# Patient Record
Sex: FEMALE | Race: BLACK OR AFRICAN AMERICAN | NOT HISPANIC OR LATINO | URBAN - METROPOLITAN AREA
[De-identification: names, ages, dates, MRNs, and addresses within clinical notes are randomized per-mention and may not be internally consistent; named-entity substitution may affect disease eponyms.]

---

## 2017-09-13 ENCOUNTER — EMERGENCY (EMERGENCY)
Facility: HOSPITAL | Age: 27
LOS: 1 days | Discharge: HOME | End: 2017-09-13
Admitting: EMERGENCY MEDICINE
Payer: SELF-PAY

## 2017-09-13 VITALS
HEART RATE: 75 BPM | SYSTOLIC BLOOD PRESSURE: 116 MMHG | OXYGEN SATURATION: 100 % | TEMPERATURE: 98 F | RESPIRATION RATE: 17 BRPM | DIASTOLIC BLOOD PRESSURE: 67 MMHG

## 2017-09-13 VITALS
DIASTOLIC BLOOD PRESSURE: 68 MMHG | SYSTOLIC BLOOD PRESSURE: 111 MMHG | OXYGEN SATURATION: 100 % | RESPIRATION RATE: 16 BRPM | TEMPERATURE: 99 F | HEART RATE: 85 BPM

## 2017-09-13 LAB
ALBUMIN SERPL ELPH-MCNC: 4.2 G/DL — SIGNIFICANT CHANGE UP (ref 3.3–5)
ALP SERPL-CCNC: 65 U/L — SIGNIFICANT CHANGE UP (ref 40–120)
ALT FLD-CCNC: 17 U/L — SIGNIFICANT CHANGE UP (ref 4–33)
APPEARANCE UR: CLEAR — SIGNIFICANT CHANGE UP
AST SERPL-CCNC: 19 U/L — SIGNIFICANT CHANGE UP (ref 4–32)
BACTERIA # UR AUTO: SIGNIFICANT CHANGE UP
BASOPHILS # BLD AUTO: 0.03 K/UL — SIGNIFICANT CHANGE UP (ref 0–0.2)
BASOPHILS NFR BLD AUTO: 0.3 % — SIGNIFICANT CHANGE UP (ref 0–2)
BILIRUB SERPL-MCNC: 0.7 MG/DL — SIGNIFICANT CHANGE UP (ref 0.2–1.2)
BILIRUB UR-MCNC: NEGATIVE — SIGNIFICANT CHANGE UP
BLOOD UR QL VISUAL: NEGATIVE — SIGNIFICANT CHANGE UP
BUN SERPL-MCNC: 10 MG/DL — SIGNIFICANT CHANGE UP (ref 7–23)
CALCIUM SERPL-MCNC: 9.4 MG/DL — SIGNIFICANT CHANGE UP (ref 8.4–10.5)
CHLORIDE SERPL-SCNC: 101 MMOL/L — SIGNIFICANT CHANGE UP (ref 98–107)
CO2 SERPL-SCNC: 21 MMOL/L — LOW (ref 22–31)
COLOR SPEC: YELLOW — SIGNIFICANT CHANGE UP
CREAT SERPL-MCNC: 0.74 MG/DL — SIGNIFICANT CHANGE UP (ref 0.5–1.3)
EOSINOPHIL # BLD AUTO: 0.39 K/UL — SIGNIFICANT CHANGE UP (ref 0–0.5)
EOSINOPHIL NFR BLD AUTO: 4.5 % — SIGNIFICANT CHANGE UP (ref 0–6)
GLUCOSE SERPL-MCNC: 88 MG/DL — SIGNIFICANT CHANGE UP (ref 70–99)
GLUCOSE UR-MCNC: NEGATIVE — SIGNIFICANT CHANGE UP
HCT VFR BLD CALC: 37.9 % — SIGNIFICANT CHANGE UP (ref 34.5–45)
HGB BLD-MCNC: 12.7 G/DL — SIGNIFICANT CHANGE UP (ref 11.5–15.5)
IMM GRANULOCYTES # BLD AUTO: 0.02 # — SIGNIFICANT CHANGE UP
IMM GRANULOCYTES NFR BLD AUTO: 0.2 % — SIGNIFICANT CHANGE UP (ref 0–1.5)
KETONES UR-MCNC: NEGATIVE — SIGNIFICANT CHANGE UP
LEUKOCYTE ESTERASE UR-ACNC: SIGNIFICANT CHANGE UP
LIDOCAIN IGE QN: 37.5 U/L — SIGNIFICANT CHANGE UP (ref 7–60)
LYMPHOCYTES # BLD AUTO: 2.36 K/UL — SIGNIFICANT CHANGE UP (ref 1–3.3)
LYMPHOCYTES # BLD AUTO: 27.5 % — SIGNIFICANT CHANGE UP (ref 13–44)
MCHC RBC-ENTMCNC: 29.8 PG — SIGNIFICANT CHANGE UP (ref 27–34)
MCHC RBC-ENTMCNC: 33.5 % — SIGNIFICANT CHANGE UP (ref 32–36)
MCV RBC AUTO: 89 FL — SIGNIFICANT CHANGE UP (ref 80–100)
MONOCYTES # BLD AUTO: 0.68 K/UL — SIGNIFICANT CHANGE UP (ref 0–0.9)
MONOCYTES NFR BLD AUTO: 7.9 % — SIGNIFICANT CHANGE UP (ref 2–14)
MUCOUS THREADS # UR AUTO: SIGNIFICANT CHANGE UP
NEUTROPHILS # BLD AUTO: 5.11 K/UL — SIGNIFICANT CHANGE UP (ref 1.8–7.4)
NEUTROPHILS NFR BLD AUTO: 59.6 % — SIGNIFICANT CHANGE UP (ref 43–77)
NITRITE UR-MCNC: NEGATIVE — SIGNIFICANT CHANGE UP
NRBC # FLD: 0 — SIGNIFICANT CHANGE UP
PH UR: 6 — SIGNIFICANT CHANGE UP (ref 4.6–8)
PLATELET # BLD AUTO: 195 K/UL — SIGNIFICANT CHANGE UP (ref 150–400)
PMV BLD: 10.8 FL — SIGNIFICANT CHANGE UP (ref 7–13)
POTASSIUM SERPL-MCNC: 3.6 MMOL/L — SIGNIFICANT CHANGE UP (ref 3.5–5.3)
POTASSIUM SERPL-SCNC: 3.6 MMOL/L — SIGNIFICANT CHANGE UP (ref 3.5–5.3)
PROT SERPL-MCNC: 7.7 G/DL — SIGNIFICANT CHANGE UP (ref 6–8.3)
PROT UR-MCNC: 10 — SIGNIFICANT CHANGE UP
RBC # BLD: 4.26 M/UL — SIGNIFICANT CHANGE UP (ref 3.8–5.2)
RBC # FLD: 13.6 % — SIGNIFICANT CHANGE UP (ref 10.3–14.5)
RBC CASTS # UR COMP ASSIST: SIGNIFICANT CHANGE UP (ref 0–?)
SODIUM SERPL-SCNC: 134 MMOL/L — LOW (ref 135–145)
SP GR SPEC: 1.02 — SIGNIFICANT CHANGE UP (ref 1–1.03)
SQUAMOUS # UR AUTO: SIGNIFICANT CHANGE UP
UROBILINOGEN FLD QL: 1 E.U. — SIGNIFICANT CHANGE UP (ref 0.1–0.2)
WBC # BLD: 8.59 K/UL — SIGNIFICANT CHANGE UP (ref 3.8–10.5)
WBC # FLD AUTO: 8.59 K/UL — SIGNIFICANT CHANGE UP (ref 3.8–10.5)

## 2017-09-13 PROCEDURE — 99284 EMERGENCY DEPT VISIT MOD MDM: CPT

## 2017-09-13 PROCEDURE — 76705 ECHO EXAM OF ABDOMEN: CPT | Mod: 26

## 2017-09-13 RX ORDER — IBUPROFEN 200 MG
600 TABLET ORAL ONCE
Qty: 0 | Refills: 0 | Status: COMPLETED | OUTPATIENT
Start: 2017-09-13 | End: 2017-09-13

## 2017-09-13 RX ORDER — SODIUM CHLORIDE 9 MG/ML
1000 INJECTION INTRAMUSCULAR; INTRAVENOUS; SUBCUTANEOUS ONCE
Qty: 0 | Refills: 0 | Status: COMPLETED | OUTPATIENT
Start: 2017-09-13 | End: 2017-09-13

## 2017-09-13 RX ADMIN — Medication 600 MILLIGRAM(S): at 23:19

## 2017-09-13 RX ADMIN — SODIUM CHLORIDE 2000 MILLILITER(S): 9 INJECTION INTRAMUSCULAR; INTRAVENOUS; SUBCUTANEOUS at 21:29

## 2017-09-13 RX ADMIN — Medication 600 MILLIGRAM(S): at 23:54

## 2017-09-13 NOTE — ED ADULT NURSE REASSESSMENT NOTE - NS ED NURSE REASSESS COMMENT FT1
rec'd pt. asleep but arousable, NS infusing via g20 saline lock on left ac with no ss of infiltration. c/o pain mid abdomen, med given as ordered. awaits dispo. will continue to monitor

## 2017-09-13 NOTE — ED PROVIDER NOTE - ABDOMINAL EXAM
Tenderness to RUQ and epigastric area. No hepatosplenomegaly. No rebound. No guarding. Easy to distract Mild TTP LUQ. No hepatosplenomegaly. No rebound. No guarding. Easy to distract

## 2017-09-13 NOTE — ED ADULT NURSE NOTE - CHPI ED SYMPTOMS NEG
no fever/no abdominal distension/no blood in stool/no hematuria/no dysuria/no vomiting/no chills/no nausea/no burning urination/no diarrhea

## 2017-09-13 NOTE — ED ADULT NURSE NOTE - OBJECTIVE STATEMENT
Pt 27y female aaox4 and ambulatory, presents to ED c/o abd pain, pain started after having sex on Monday, abd tender to touch, Pt denies n/v/d, fever, chills, ha, cp, sob, hematuria. Pt states she was previously treated from chlamydia, that she contracted from ex. Pt states she has been experiencing vaginal discharge. Pt appears comfortable, IV placed and labs sent, will continue to monitor.

## 2017-09-13 NOTE — ED PROVIDER NOTE - CARE PLAN
Principal Discharge DX:	Abdominal pain, unspecified location  Instructions for follow-up, activity and diet:	Follow up with a primary care doctor within 48-72 hours or our clinic [phone]- You can call: [facility]ician helpline ([phone]) for assistance. Take copies of your reports given to you.  Take Motrin 600mg every 6-8hrs as needed for pain with food. Rest, no heavy lifting. Worsening, continued or new concerning symptoms return to the emergency department.

## 2017-09-13 NOTE — ED PROVIDER NOTE - MEDICAL DECISION MAKING DETAILS
27y F with LUQ and epigastric pain s/p intercourse without vaginal discharge, fever, nausea, vomiting, or urinary s/x. Plan: basic labs, abd US, NSAIDs, and PMD follow up 27y F with LUQ pain s/p intercourse worse with movement- Plan: basic labs with LFT's, abd US, NSAIDs, and PMD follow up

## 2017-09-13 NOTE — ED PROVIDER NOTE - PLAN OF CARE
Follow up with a primary care doctor within 48-72 hours or our clinic [phone]- You can call: [facility]ician helpline ([phone]) for assistance. Take copies of your reports given to you.  Take Motrin 600mg every 6-8hrs as needed for pain with food. Rest, no heavy lifting. Worsening, continued or new concerning symptoms return to the emergency department.

## 2017-09-13 NOTE — ED PROVIDER NOTE - PROGRESS NOTE DETAILS
PA Tiberio- labs stable. Abdominal US negative. Pain improved with Motrin- likely musculoskeletal will DC with PMD follow up

## 2017-09-13 NOTE — ED ADULT TRIAGE NOTE - CHIEF COMPLAINT QUOTE
Pt c/o intermittent abd pain since having sex on an air mattress Monday.  Denies N/V/D.  Says it feels better with topical ice.  Appears comfortable

## 2017-09-13 NOTE — ED PROVIDER NOTE - OBJECTIVE STATEMENT
27y F with no significant PMHx presents to the ED for LUQ abd and epigastric pain x3 days s/p having intercourse with boyfriend. Pt is sexually active and monogamous. Uses condom most of the time. Does state possible hx of chlamydia. Pt did not officially get results but was treated anyways even though she was asymptomatic because boyfriend tested positive for chlamydia few years ago. Denies nausea, vomiting, diarrhea, CP, or SOB. States pain is worse with movement and taking deep breaths. LNMP 1 month ago. Does not think she is pregnant 27y F with no significant PMHx presents to the ED for LUQ abd and epigastric pain x3 days s/p having intercourse with boyfriend. Pt is sexually active and monogamous. Uses condom most of the time. Does state possible hx of chlamydia. Pt did not officially get results but was treated anyways even though she was asymptomatic because boyfriend tested positive for chlamydia few years ago. Denies vaginal DC, dysuria, nausea, vomiting, diarrhea, CP, or SOB. States pain is worse with movement and taking deep breaths. LNMP 1 month ago. Does not think she is pregnant 27y F with no significant PMHx presents to the ED for LUQ abd pain x3 days s/p having intercourse with boyfriend. Pt is sexually active and monogamous. Uses condom most of the time. Does state possible hx of chlamydia a few years ago. Pt did not officially get results but was treated any way even though she was asymptomatic because boyfriend tested positive for chlamydia. Denies vaginal DC, dysuria, nausea, vomiting, diarrhea, CP, or SOB. States pain is worse with movement and taking deep breaths. LNMP 1 month ago. Does not think she is pregnant. Appears well no distress.

## 2017-09-15 LAB
BACTERIA UR CULT: SIGNIFICANT CHANGE UP
SPECIMEN SOURCE: SIGNIFICANT CHANGE UP